# Patient Record
Sex: MALE | Race: WHITE | Employment: FULL TIME | URBAN - METROPOLITAN AREA
[De-identification: names, ages, dates, MRNs, and addresses within clinical notes are randomized per-mention and may not be internally consistent; named-entity substitution may affect disease eponyms.]

---

## 2021-09-17 ENCOUNTER — HOSPITAL ENCOUNTER (EMERGENCY)
Age: 46
Discharge: HOME | End: 2021-09-17
Attending: STUDENT IN AN ORGANIZED HEALTH CARE EDUCATION/TRAINING PROGRAM
Payer: COMMERCIAL

## 2021-09-17 ENCOUNTER — APPOINTMENT (OUTPATIENT)
Dept: GENERAL RADIOLOGY | Age: 46
End: 2021-09-17
Attending: PHYSICIAN ASSISTANT
Payer: COMMERCIAL

## 2021-09-17 VITALS
TEMPERATURE: 98.7 F | HEART RATE: 99 BPM | OXYGEN SATURATION: 99 % | DIASTOLIC BLOOD PRESSURE: 102 MMHG | RESPIRATION RATE: 16 BRPM | SYSTOLIC BLOOD PRESSURE: 153 MMHG

## 2021-09-17 DIAGNOSIS — S61.011A LACERATION OF RIGHT THUMB WITHOUT FOREIGN BODY WITHOUT DAMAGE TO NAIL, INITIAL ENCOUNTER: Primary | ICD-10-CM

## 2021-09-17 PROCEDURE — 75810000293 HC SIMP/SUPERF WND  RPR

## 2021-09-17 PROCEDURE — 99282 EMERGENCY DEPT VISIT SF MDM: CPT

## 2021-09-17 PROCEDURE — 90471 IMMUNIZATION ADMIN: CPT

## 2021-09-17 PROCEDURE — 73130 X-RAY EXAM OF HAND: CPT

## 2021-09-17 PROCEDURE — 90715 TDAP VACCINE 7 YRS/> IM: CPT | Performed by: PHYSICIAN ASSISTANT

## 2021-09-17 PROCEDURE — 74011250636 HC RX REV CODE- 250/636: Performed by: PHYSICIAN ASSISTANT

## 2021-09-17 RX ORDER — OXYCODONE AND ACETAMINOPHEN 5; 325 MG/1; MG/1
1 TABLET ORAL
Status: DISCONTINUED | OUTPATIENT
Start: 2021-09-17 | End: 2021-09-17 | Stop reason: HOSPADM

## 2021-09-17 RX ORDER — CEPHALEXIN 500 MG/1
500 CAPSULE ORAL 4 TIMES DAILY
Qty: 40 CAPSULE | Refills: 0 | Status: SHIPPED | OUTPATIENT
Start: 2021-09-17 | End: 2021-09-27

## 2021-09-17 RX ADMIN — TETANUS TOXOID, REDUCED DIPHTHERIA TOXOID AND ACELLULAR PERTUSSIS VACCINE, ADSORBED 0.5 ML: 5; 2.5; 8; 8; 2.5 SUSPENSION INTRAMUSCULAR at 17:12

## 2021-09-17 NOTE — ED PROVIDER NOTES
EMERGENCY DEPARTMENT HISTORY AND PHYSICAL EXAM    Date: 9/17/2021  Patient Name: Monica Barker    History of Presenting Illness     Chief Complaint   Patient presents with    Laceration     right hand         History Provided By: Patient and Patient's Wife        Additional History (Context): Monica Barker is a 48 y.o. male with No significant past medical history who presents with right thumb laceration after cutting himself on a door jam he was trying to repair at work today. Unsure of last tetanus. Denies numbness weakness or diminished range of motion. He is right-hand dominant. PCP: None    Current Facility-Administered Medications   Medication Dose Route Frequency Provider Last Rate Last Admin    diph,Pertuss(AC),Tet Vac-PF (BOOSTRIX) suspension 0.5 mL  0.5 mL IntraMUSCular PRIOR TO DISCHARGE Benja Kilpatrick        oxyCODONE-acetaminophen (PERCOCET) 5-325 mg per tablet 1 Tablet  1 Tablet Oral NOW ALVARO Kilpatrick         Current Outpatient Medications   Medication Sig Dispense Refill    cephALEXin (Keflex) 500 mg capsule Take 1 Capsule by mouth four (4) times daily for 10 days. 40 Capsule 0    oxyCODONE-acetaminophen (PERCOCET) 5-325 mg per tablet Take 1 Tab by mouth every four (4) hours as needed for Pain. Max Daily Amount: 6 Tabs. 12 Tab 0    cyclobenzaprine (FLEXERIL) 10 mg tablet Take 1 Tab by mouth three (3) times daily as needed for Muscle Spasm(s). 15 Tab 0       Past History     Past Medical History:  No past medical history on file. Past Surgical History:  No past surgical history on file. Family History:  No family history on file. Social History:  Social History     Tobacco Use    Smoking status: Current Every Day Smoker   Substance Use Topics    Alcohol use: Yes    Drug use: No       Allergies:  No Known Allergies      Review of Systems   Review of Systems   Skin: Positive for wound. Neurological: Negative for weakness and numbness.    Hematological: Does not bruise/bleed easily. All Other Systems Negative  Physical Exam     Vitals:    09/17/21 1616   BP: (!) 153/102   Pulse: 99   Resp: 16   Temp: 98.7 °F (37.1 °C)   SpO2: 99%     Physical Exam  Vitals and nursing note reviewed. Constitutional:       General: He is not in acute distress. Appearance: He is well-developed. He is not ill-appearing, toxic-appearing or diaphoretic. HENT:      Head: Normocephalic and atraumatic. Neck:      Thyroid: No thyromegaly. Vascular: No carotid bruit. Trachea: No tracheal deviation. Cardiovascular:      Rate and Rhythm: Normal rate and regular rhythm. Heart sounds: Normal heart sounds. No murmur heard. No friction rub. No gallop. Pulmonary:      Effort: Pulmonary effort is normal. No respiratory distress. Breath sounds: Normal breath sounds. No stridor. No wheezing or rales. Chest:      Chest wall: No tenderness. Abdominal:      General: There is no distension. Palpations: Abdomen is soft. There is no mass. Tenderness: There is no abdominal tenderness. There is no guarding or rebound. Musculoskeletal:         General: Signs of injury present. Normal range of motion. Cervical back: Normal range of motion and neck supple. Skin:     General: Skin is warm and dry. Coloration: Skin is not pale. Findings: Lesion present. Comments: Right hand: On the dorsal aspect of the right thumb across the DIP joint there is a 3 cm laceration linear in orientation not actively bleeding. Cap refill less than 2 seconds sensation intact throughout full range of motion. Neurological:      Mental Status: He is alert. Psychiatric:         Speech: Speech normal.         Behavior: Behavior normal.         Thought Content: Thought content normal.         Judgment: Judgment normal.          Diagnostic Study Results     Labs -   No results found for this or any previous visit (from the past 12 hour(s)).     Radiologic Studies -   XR HAND RT MIN 3 V   Final Result      Soft tissue injury without radiopaque foreign body or acute osseous abnormality. Old healed fifth metacarpal fracture. CT Results  (Last 48 hours)    None        CXR Results  (Last 48 hours)    None            Medical Decision Making   I am the first provider for this patient. I reviewed the vital signs, available nursing notes, past medical history, past surgical history, family history and social history. Vital Signs-Reviewed the patient's vital signs. Records Reviewed: Nursing Notes    Procedures:  Wound Repair    Date/Time: 9/17/2021 5:00 PM  [facility]: Mora Suarez  Preparation: skin prepped with Shur-Clens  Pre-procedure re-eval: Immediately prior to the procedure, the patient was reevaluated and found suitable for the planned procedure and any planned medications. Time out: Immediately prior to the procedure a time out was called to verify the correct patient, procedure, equipment, staff and marking as appropriate. .  Location details: right thumb  Wound length:2.6 - 7.5 cm  Anesthesia: local infiltration    Anesthesia:  Local Anesthetic: lidocaine 1% without epinephrine  Anesthetic total: 4 mL  Foreign bodies: no foreign bodies  Irrigation solution: saline  Debridement: none  Skin closure: 4-0 nylon  Number of sutures: 9  Technique: simple and interrupted  Dressing: 4x4, gauze roll and splint  Patient tolerance: patient tolerated the procedure well with no immediate complications  My total time at bedside, performing this procedure was 1-15 minutes. Provider Notes (Medical Decision Making): Laceration repaired. Quick clot applied. Will after wrapping place thumb spica splint. Refer to hand surgeon for follow-up and sutures removed in 2 weeks. Keflex sent to preferred pharmacy. Splint applied by nurse to right wrist; excellent position. N/v intact before and after application.       MED RECONCILIATION:  Current Facility-Administered Medications   Medication Dose Route Frequency    diph,Pertuss(AC),Tet Vac-PF (BOOSTRIX) suspension 0.5 mL  0.5 mL IntraMUSCular PRIOR TO DISCHARGE    oxyCODONE-acetaminophen (PERCOCET) 5-325 mg per tablet 1 Tablet  1 Tablet Oral NOW     Current Outpatient Medications   Medication Sig    cephALEXin (Keflex) 500 mg capsule Take 1 Capsule by mouth four (4) times daily for 10 days.  oxyCODONE-acetaminophen (PERCOCET) 5-325 mg per tablet Take 1 Tab by mouth every four (4) hours as needed for Pain. Max Daily Amount: 6 Tabs.  cyclobenzaprine (FLEXERIL) 10 mg tablet Take 1 Tab by mouth three (3) times daily as needed for Muscle Spasm(s). Disposition:  home    DISCHARGE NOTE:   5:01 PM    Pt has been reexamined. Patient has no new complaints, changes, or physical findings. Care plan outlined and precautions discussed. Results of exam, x-rays were reviewed with the patient. All medications were reviewed with the patient. All of pt's questions and concerns were addressed. Patient was instructed and agrees to follow up with hand ortho, as well as to return to the ED upon further deterioration. Patient is ready to go home. Follow-up Information     Follow up With Specialties Details Why Contact Florian ORTIZ, DO Hand Surgery Schedule an appointment as soon as possible for a visit in 3 days  [address] [place] [address]  [phone]            Current Discharge Medication List      START taking these medications    Details   cephALEXin (Keflex) 500 mg capsule Take 1 Capsule by mouth four (4) times daily for 10 days. Qty: 40 Capsule, Refills: 0  Start date: 9/17/2021, End date: 9/27/2021               Diagnosis     Clinical Impression:   1.  Laceration of right thumb without foreign body without damage to nail, initial encounter

## 2021-09-23 ENCOUNTER — OFFICE VISIT (OUTPATIENT)
Dept: ORTHOPEDIC SURGERY | Age: 46
End: 2021-09-23
Payer: COMMERCIAL

## 2021-09-23 VITALS
WEIGHT: 135 LBS | BODY MASS INDEX: 19.99 KG/M2 | HEIGHT: 69 IN | HEART RATE: 92 BPM | OXYGEN SATURATION: 99 % | TEMPERATURE: 97.8 F

## 2021-09-23 DIAGNOSIS — S61.011A LACERATION OF RIGHT THUMB WITHOUT FOREIGN BODY WITHOUT DAMAGE TO NAIL, INITIAL ENCOUNTER: Primary | ICD-10-CM

## 2021-09-23 PROCEDURE — 99203 OFFICE O/P NEW LOW 30 MIN: CPT | Performed by: ORTHOPAEDIC SURGERY

## 2021-09-23 NOTE — PROGRESS NOTES
Rimma Lanier is a 48 y.o. male right handed . Worker's Compensation and legal considerations: not known and none filed. Vitals:    09/23/21 0838   Pulse: 92   Temp: 97.8 °F (36.6 °C)   TempSrc: Temporal   SpO2: 99%   Weight: 135 lb (61.2 kg)   Height: 5' 9\" (1.753 m)   PainSc:   0 - No pain   PainLoc: Finger           Chief Complaint   Patient presents with    Thumb Pain     right thumb         HPI: Patient presents today with complaints of right thumb injury and ER follow-up after was sutured in the emergency department. He reports minimal pain. He is currently taking antibiotics. Date of onset: 9/17/2021    Injury: Yes: Comment: Tablesaw laceration    Prior Treatment:  Yes: Comment: ED antibiotics and suture    Numbness/ Tingling: No      ROS: Review of Systems - General ROS: negative  Psychological ROS: negative  ENT ROS: negative  Allergy and Immunology ROS: negative  Hematological and Lymphatic ROS: negative  Respiratory ROS: no cough, shortness of breath, or wheezing  Cardiovascular ROS: no chest pain or dyspnea on exertion  Gastrointestinal ROS: no abdominal pain, change in bowel habits, or black or bloody stools  Musculoskeletal ROS: negative  Neurological ROS: negative  Dermatological ROS: negative    History reviewed. No pertinent past medical history. History reviewed. No pertinent surgical history. Current Outpatient Medications   Medication Sig Dispense Refill    cephALEXin (Keflex) 500 mg capsule Take 1 Capsule by mouth four (4) times daily for 10 days. (Patient not taking: Reported on 9/23/2021) 40 Capsule 0    oxyCODONE-acetaminophen (PERCOCET) 5-325 mg per tablet Take 1 Tab by mouth every four (4) hours as needed for Pain. Max Daily Amount: 6 Tabs. (Patient not taking: Reported on 9/23/2021) 12 Tab 0    cyclobenzaprine (FLEXERIL) 10 mg tablet Take 1 Tab by mouth three (3) times daily as needed for Muscle Spasm(s).  (Patient not taking: Reported on 9/23/2021) 15 Tab 0 No Known Allergies        PE:     Physical Exam  Vitals and nursing note reviewed. Constitutional:       General: He is not in acute distress. Appearance: Normal appearance. He is not ill-appearing. Cardiovascular:      Pulses: Normal pulses. Pulmonary:      Effort: Pulmonary effort is normal. No respiratory distress. Musculoskeletal:         General: Tenderness present. No swelling, deformity or signs of injury. Normal range of motion. Cervical back: Normal range of motion and neck supple. Right lower leg: No edema. Left lower leg: No edema. Skin:     General: Skin is warm and dry. Capillary Refill: Capillary refill takes less than 2 seconds. Findings: No bruising or erythema. Neurological:      General: No focal deficit present. Mental Status: He is alert. Cranial Nerves: No cranial nerve deficit. Sensory: No sensory deficit. Psychiatric:         Mood and Affect: Mood normal.         Behavior: Behavior normal.            Right thumb: Laceration repair clean dry and intact no drainage breakdown erythema or any signs of infection. Range of motion of the thumb is within normal limits. Imagin2021 3 views of right hand:  IMPRESSION  Soft tissue injury without radiopaque foreign body or acute osseous abnormality. Old healed fifth metacarpal fracture. ICD-10-CM ICD-9-CM    1. Laceration of right thumb without foreign body without damage to nail, initial encounter  S61.011A 883.0          Plan:     Discussed wound care and completing antibiotics. We will see the patient back for suture removal next week. Follow-up and Dispositions    · Return in about 6 days (around 2021) for suture removal and wound check.           Plan was reviewed with patient, who verbalized agreement and understanding of the plan

## 2021-09-29 ENCOUNTER — OFFICE VISIT (OUTPATIENT)
Dept: ORTHOPEDIC SURGERY | Age: 46
End: 2021-09-29
Payer: COMMERCIAL

## 2021-09-29 VITALS
RESPIRATION RATE: 16 BRPM | OXYGEN SATURATION: 100 % | BODY MASS INDEX: 19.76 KG/M2 | TEMPERATURE: 97.3 F | WEIGHT: 133.4 LBS | HEIGHT: 69 IN | HEART RATE: 86 BPM

## 2021-09-29 DIAGNOSIS — S61.011A LACERATION OF RIGHT THUMB WITHOUT FOREIGN BODY WITHOUT DAMAGE TO NAIL, INITIAL ENCOUNTER: Primary | ICD-10-CM

## 2021-09-29 PROCEDURE — 99213 OFFICE O/P EST LOW 20 MIN: CPT | Performed by: ORTHOPAEDIC SURGERY

## 2021-09-29 NOTE — PROGRESS NOTES
Corby Garcia is a 48 y.o. male right handed . Worker's Compensation and legal considerations: not known and none filed. Vitals:    09/29/21 1508   Pulse: 86   Resp: 16   Temp: 97.3 °F (36.3 °C)   TempSrc: Temporal   SpO2: 100%   Weight: 133 lb 6.4 oz (60.5 kg)   Height: 5' 9\" (1.753 m)   PainSc:   0 - No pain   PainLoc: Finger           Chief Complaint   Patient presents with    Hand Pain     right thumb pain       HPI: Patient returns today for right thumb laceration follow-up and suture removal.  He reports he is still on antibiotics and he says that his pain has not worsened. He denies any drainage. Initial HPI: Patient presents today with complaints of right thumb injury and ER follow-up after was sutured in the emergency department. He reports minimal pain. He is currently taking antibiotics. Date of onset: 9/17/2021    Injury: Yes: Comment: Tablesaw laceration    Prior Treatment:  Yes: Comment: ED antibiotics and suture    Numbness/ Tingling: No      ROS: Review of Systems - General ROS: negative  Psychological ROS: negative  ENT ROS: negative  Allergy and Immunology ROS: negative  Hematological and Lymphatic ROS: negative  Respiratory ROS: no cough, shortness of breath, or wheezing  Cardiovascular ROS: no chest pain or dyspnea on exertion  Gastrointestinal ROS: no abdominal pain, change in bowel habits, or black or bloody stools  Musculoskeletal ROS: negative  Neurological ROS: negative  Dermatological ROS: negative    History reviewed. No pertinent past medical history. History reviewed. No pertinent surgical history. Current Outpatient Medications   Medication Sig Dispense Refill    oxyCODONE-acetaminophen (PERCOCET) 5-325 mg per tablet Take 1 Tab by mouth every four (4) hours as needed for Pain. Max Daily Amount: 6 Tabs.  (Patient not taking: Reported on 9/23/2021) 12 Tab 0    cyclobenzaprine (FLEXERIL) 10 mg tablet Take 1 Tab by mouth three (3) times daily as needed for Muscle Spasm(s). (Patient not taking: Reported on 2021) 15 Tab 0       No Known Allergies        PE:     Physical Exam  Vitals and nursing note reviewed. Constitutional:       General: He is not in acute distress. Appearance: Normal appearance. He is not ill-appearing. Cardiovascular:      Pulses: Normal pulses. Pulmonary:      Effort: Pulmonary effort is normal. No respiratory distress. Musculoskeletal:         General: Tenderness present. No swelling, deformity or signs of injury. Normal range of motion. Cervical back: Normal range of motion and neck supple. Right lower leg: No edema. Left lower leg: No edema. Skin:     General: Skin is warm and dry. Capillary Refill: Capillary refill takes less than 2 seconds. Findings: No bruising or erythema. Neurological:      General: No focal deficit present. Mental Status: He is alert. Cranial Nerves: No cranial nerve deficit. Sensory: No sensory deficit. Psychiatric:         Mood and Affect: Mood normal.         Behavior: Behavior normal.            Right thumb: Incision is clean dry and intact no drainage breakdown or any signs of infection. There is mild erythema likely reactive to the suture. Sutures been removed today and wound appears approximately 75% healed and Steri-Strips have been placed. Imagin2021 3 views of right hand:  IMPRESSION  Soft tissue injury without radiopaque foreign body or acute osseous abnormality. Old healed fifth metacarpal fracture. ICD-10-CM ICD-9-CM    1. Laceration of right thumb without foreign body without damage to nail, initial encounter  S61.011A 883.0          Plan:     Steri-Strips placed today. Patient has been instructed on completing antibiotics and wound care. Follow-up and Dispositions    · Return if symptoms worsen or fail to improve.           Plan was reviewed with patient, who verbalized agreement and understanding of the plan